# Patient Record
Sex: MALE | Race: WHITE | NOT HISPANIC OR LATINO | URBAN - METROPOLITAN AREA
[De-identification: names, ages, dates, MRNs, and addresses within clinical notes are randomized per-mention and may not be internally consistent; named-entity substitution may affect disease eponyms.]

---

## 2024-03-14 ENCOUNTER — OV NP (OUTPATIENT)
Dept: URBAN - METROPOLITAN AREA CLINIC 118 | Facility: CLINIC | Age: 44
End: 2024-03-14
Payer: COMMERCIAL

## 2024-03-14 VITALS
SYSTOLIC BLOOD PRESSURE: 120 MMHG | WEIGHT: 223 LBS | BODY MASS INDEX: 31.92 KG/M2 | HEART RATE: 68 BPM | DIASTOLIC BLOOD PRESSURE: 78 MMHG | HEIGHT: 70 IN | TEMPERATURE: 98.2 F

## 2024-03-14 DIAGNOSIS — R10.84 GENERALIZED ABDOMINAL PAIN: ICD-10-CM

## 2024-03-14 DIAGNOSIS — K52.89 (LYMPHOCYTIC) MICROSCOPIC COLITIS: ICD-10-CM

## 2024-03-14 DIAGNOSIS — R63.4 WEIGHT LOSS: ICD-10-CM

## 2024-03-14 PROCEDURE — 99204 OFFICE O/P NEW MOD 45 MIN: CPT | Performed by: INTERNAL MEDICINE

## 2024-03-14 RX ORDER — SIMVASTATIN 40 MG/1
TABLET, FILM COATED ORAL
Qty: 90 TABLET | Status: ACTIVE | COMMUNITY

## 2024-03-14 RX ORDER — METFORMIN HCL 500 MG/1
TABLET, FILM COATED ORAL
Qty: 90 TABLET | Status: ACTIVE | COMMUNITY

## 2024-03-14 NOTE — HPI-TODAY'S VISIT:
The patient presents for evaluation of chronic diarrhea, abdominal pain and weight loss.  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  h/o obesity, has had intentional weight loss over the last few years with dietary changes.  He has had diarrhea for ~1 year, with 6-10 loose, non-bloody bm's per day.  Occassional nocturnal diarrhea episodes.  Has generalized abd discomfort a few times per week, preceds bm's at times and improves after bowel movement.  no obvious association with certain meals or dairy.  has lost ~10 lbs in the past 6 months but weight has stabilized.  no new recent meds.  has been on metformin for pre-diabetes for 4+ years.  was on abx last year (amoxicillin).

## 2024-03-22 LAB
A/G RATIO: 2.3
ABSOLUTE BASOPHILS: 41
ABSOLUTE EOSINOPHILS: 102
ABSOLUTE LYMPHOCYTES: 1408
ABSOLUTE MONOCYTES: 377
ABSOLUTE NEUTROPHILS: 3172
ALBUMIN: 4.1
ALKALINE PHOSPHATASE: 158
ALT (SGPT): 66
AST (SGOT): 38
BASOPHILS: 0.8
BILIRUBIN, TOTAL: 0.5
BUN/CREATININE RATIO: (no result)
BUN: 11
CALCIUM: 8.3
CARBON DIOXIDE, TOTAL: 25
CHLORIDE: 104
CREATININE: 0.76
EGFR: 113
ENDOMYSIAL ANTIBODY SCR: POSITIVE
ENDOMYSIAL ANTIBODY TITER: (no result)
EOSINOPHILS: 2
GLOBULIN, TOTAL: 1.8
GLUCOSE: 127
HEMATOCRIT: 43
HEMOGLOBIN: 14.3
IMMUNOGLOBULIN A: 212
INTERPRETATION: (no result)
LYMPHOCYTES: 27.6
MCH: 29.5
MCHC: 33.3
MCV: 88.7
MONOCYTES: 7.4
MPV: 10.1
NEUTROPHILS: 62.2
PLATELET COUNT: 192
POTASSIUM: 3.7
PROTEIN, TOTAL: 5.9
RDW: 12.3
RED BLOOD CELL COUNT: 4.85
SODIUM: 139
TISSUE TRANSGLUTAMINASE AB, IGA: >250
WHITE BLOOD CELL COUNT: 5.1

## 2024-03-23 LAB
CALPROTECTIN, FECAL: 91
CLOSTRIDIUM DIFFICILE TOXINB,QL REAL TIME PCR: NOT DETECTED

## 2024-03-25 ENCOUNTER — OV EP (OUTPATIENT)
Dept: URBAN - METROPOLITAN AREA CLINIC 118 | Facility: CLINIC | Age: 44
End: 2024-03-25
Payer: COMMERCIAL

## 2024-03-25 ENCOUNTER — LAB (OUTPATIENT)
Dept: URBAN - METROPOLITAN AREA CLINIC 118 | Facility: CLINIC | Age: 44
End: 2024-03-25

## 2024-03-25 VITALS
HEART RATE: 69 BPM | BODY MASS INDEX: 32.33 KG/M2 | TEMPERATURE: 98.4 F | HEIGHT: 70 IN | WEIGHT: 225.8 LBS | SYSTOLIC BLOOD PRESSURE: 112 MMHG | DIASTOLIC BLOOD PRESSURE: 73 MMHG

## 2024-03-25 DIAGNOSIS — R63.4 WEIGHT LOSS: ICD-10-CM

## 2024-03-25 DIAGNOSIS — K52.9 CHRONIC DIARRHEA: ICD-10-CM

## 2024-03-25 DIAGNOSIS — R10.84 GENERALIZED ABDOMINAL PAIN: ICD-10-CM

## 2024-03-25 DIAGNOSIS — K90.0 CELIAC DISEASE: ICD-10-CM

## 2024-03-25 PROBLEM — 396331005: Status: ACTIVE | Noted: 2024-03-25

## 2024-03-25 PROCEDURE — 99214 OFFICE O/P EST MOD 30 MIN: CPT | Performed by: INTERNAL MEDICINE

## 2024-03-25 RX ORDER — SIMVASTATIN 40 MG/1
TABLET, FILM COATED ORAL
Qty: 90 TABLET | Status: ACTIVE | COMMUNITY

## 2024-03-25 RX ORDER — METFORMIN HCL 500 MG/1
TABLET, FILM COATED ORAL
Qty: 90 TABLET | Status: ACTIVE | COMMUNITY

## 2024-03-25 NOTE — HPI-TODAY'S VISIT:
The patient presents for f/u appt.  continues to have abd bloating/diarrhea, mostly unchanged.  discussed labs with pt, suggesting celiac disease.

## 2024-03-27 ENCOUNTER — TELNP (OUTPATIENT)
Dept: URBAN - METROPOLITAN AREA TELEHEALTH 2 | Facility: TELEHEALTH | Age: 44
End: 2024-03-27

## 2024-03-27 RX ORDER — METFORMIN HCL 500 MG/1
TABLET, FILM COATED ORAL
Qty: 90 TABLET | Status: ACTIVE | COMMUNITY

## 2024-03-27 RX ORDER — SIMVASTATIN 40 MG/1
TABLET, FILM COATED ORAL
Qty: 90 TABLET | Status: ACTIVE | COMMUNITY

## 2024-04-11 ENCOUNTER — LAB (OUTPATIENT)
Dept: URBAN - METROPOLITAN AREA CLINIC 4 | Facility: CLINIC | Age: 44
End: 2024-04-11
Payer: COMMERCIAL

## 2024-04-11 ENCOUNTER — COL/EGD (OUTPATIENT)
Dept: URBAN - METROPOLITAN AREA SURGERY CENTER 23 | Facility: SURGERY CENTER | Age: 44
End: 2024-04-11
Payer: COMMERCIAL

## 2024-04-11 DIAGNOSIS — B96.81 HELICOBACTER PYLORI [H. PYLORI] AS THE CAUSE OF DISEASES CLASSIFIED ELSEWHERE: ICD-10-CM

## 2024-04-11 DIAGNOSIS — B96.81 BACTERIAL INFECTION DUE TO H. PYLORI: ICD-10-CM

## 2024-04-11 DIAGNOSIS — K25.9 ANTRAL ULCER: ICD-10-CM

## 2024-04-11 DIAGNOSIS — R19.7 ACUTE DIARRHEA: ICD-10-CM

## 2024-04-11 DIAGNOSIS — K29.80 ACUTE DUODENITIS: ICD-10-CM

## 2024-04-11 DIAGNOSIS — D12.3 ADENOMA OF TRANSVERSE COLON: ICD-10-CM

## 2024-04-11 DIAGNOSIS — K29.60 ADENOPAPILLOMATOSIS GASTRICA: ICD-10-CM

## 2024-04-11 DIAGNOSIS — K29.60 OTHER GASTRITIS WITHOUT BLEEDING: ICD-10-CM

## 2024-04-11 DIAGNOSIS — K90.0 CELIAC DISEASE: ICD-10-CM

## 2024-04-11 DIAGNOSIS — K90.1 CELIAC SPRUE: ICD-10-CM

## 2024-04-11 DIAGNOSIS — K29.81 DUODENITIS WITH BLEEDING: ICD-10-CM

## 2024-04-11 PROCEDURE — 88305 TISSUE EXAM BY PATHOLOGIST: CPT | Performed by: PATHOLOGY

## 2024-04-11 PROCEDURE — 88313 SPECIAL STAINS GROUP 2: CPT | Performed by: PATHOLOGY

## 2024-04-11 PROCEDURE — 45380 COLONOSCOPY AND BIOPSY: CPT | Performed by: INTERNAL MEDICINE

## 2024-04-11 PROCEDURE — 43239 EGD BIOPSY SINGLE/MULTIPLE: CPT | Performed by: INTERNAL MEDICINE

## 2024-04-11 PROCEDURE — 88342 IMHCHEM/IMCYTCHM 1ST ANTB: CPT | Performed by: PATHOLOGY

## 2024-04-11 PROCEDURE — 45385 COLONOSCOPY W/LESION REMOVAL: CPT | Performed by: INTERNAL MEDICINE

## 2024-04-11 RX ORDER — METFORMIN HCL 500 MG/1
TABLET, FILM COATED ORAL
Qty: 90 TABLET | Status: ACTIVE | COMMUNITY

## 2024-04-11 RX ORDER — SIMVASTATIN 40 MG/1
TABLET, FILM COATED ORAL
Qty: 90 TABLET | Status: ACTIVE | COMMUNITY

## 2024-04-25 ENCOUNTER — OV EP (OUTPATIENT)
Dept: URBAN - METROPOLITAN AREA CLINIC 118 | Facility: CLINIC | Age: 44
End: 2024-04-25
Payer: COMMERCIAL

## 2024-04-25 VITALS
TEMPERATURE: 98.1 F | BODY MASS INDEX: 31.07 KG/M2 | SYSTOLIC BLOOD PRESSURE: 121 MMHG | HEIGHT: 70 IN | WEIGHT: 217 LBS | DIASTOLIC BLOOD PRESSURE: 80 MMHG | HEART RATE: 65 BPM

## 2024-04-25 DIAGNOSIS — K90.0 CELIAC DISEASE: ICD-10-CM

## 2024-04-25 DIAGNOSIS — K25.9 GASTRIC ULCER WITHOUT HEMORRHAGE OR PERFORATION, UNSPECIFIED CHRONICITY: ICD-10-CM

## 2024-04-25 DIAGNOSIS — R63.4 WEIGHT LOSS, UNINTENTIONAL: ICD-10-CM

## 2024-04-25 DIAGNOSIS — A04.8 H. PYLORI INFECTION: ICD-10-CM

## 2024-04-25 PROBLEM — 73481001: Status: ACTIVE | Noted: 2024-04-25

## 2024-04-25 PROBLEM — 721730009: Status: ACTIVE | Noted: 2024-04-25

## 2024-04-25 PROBLEM — 428283002: Status: ACTIVE | Noted: 2024-04-25

## 2024-04-25 PROCEDURE — 99214 OFFICE O/P EST MOD 30 MIN: CPT | Performed by: INTERNAL MEDICINE

## 2024-04-25 RX ORDER — CLARITHROMYCIN 500 MG/1
1 TABLET TABLET ORAL
Qty: 20 TABLET | Refills: 0 | OUTPATIENT
Start: 2024-04-25 | End: 2024-05-05

## 2024-04-25 RX ORDER — AMOXICILLIN 500 MG/1
2 CAPSULES CAPSULE ORAL
Qty: 40 CAPSULE | Refills: 0 | OUTPATIENT
Start: 2024-04-25 | End: 2024-05-05

## 2024-04-25 RX ORDER — SIMVASTATIN 40 MG/1
TABLET, FILM COATED ORAL
Qty: 90 TABLET | Status: DISCONTINUED | COMMUNITY

## 2024-04-25 RX ORDER — METFORMIN HCL 500 MG/1
TABLET, FILM COATED ORAL
Qty: 90 TABLET | Status: DISCONTINUED | COMMUNITY

## 2024-04-25 RX ORDER — PANTOPRAZOLE SODIUM 40 MG/1
1 TABLET TABLET, DELAYED RELEASE ORAL ONCE A DAY
Qty: 30 | Refills: 3 | Status: ACTIVE | COMMUNITY
Start: 2024-04-11

## 2024-04-25 NOTE — HPI-TODAY'S VISIT:
The patient presents for f/u appt.  reports diarrhea is better since last appt.  has refrained from gluten and had stopped metformin.  appetite is good.  has had some weight loss since last appt.  discussed biopsy results from procedures with pt.

## 2024-07-01 ENCOUNTER — DASHBOARD ENCOUNTERS (OUTPATIENT)
Age: 44
End: 2024-07-01

## 2024-07-17 ENCOUNTER — ERX REFILL RESPONSE (OUTPATIENT)
Dept: URBAN - METROPOLITAN AREA CLINIC 109 | Facility: CLINIC | Age: 44
End: 2024-07-17

## 2024-07-17 RX ORDER — PANTOPRAZOLE SODIUM 40 MG/1
1 TABLET TABLET, DELAYED RELEASE ORAL ONCE A DAY
Qty: 30 | Refills: 3 | OUTPATIENT

## 2024-07-17 RX ORDER — PANTOPRAZOLE SODIUM 40 MG/1
TAKE ONE TABLET BY MOUTH ONE TIME DAILY TABLET, DELAYED RELEASE ORAL
Qty: 30 TABLET | Refills: 3 | OUTPATIENT

## 2024-07-26 ENCOUNTER — OFFICE VISIT (OUTPATIENT)
Dept: URBAN - METROPOLITAN AREA CLINIC 118 | Facility: CLINIC | Age: 44
End: 2024-07-26

## 2024-07-26 RX ORDER — PANTOPRAZOLE SODIUM 40 MG/1
1 TABLET TABLET, DELAYED RELEASE ORAL ONCE A DAY
Qty: 30 | Refills: 3 | COMMUNITY
Start: 2024-04-11

## 2025-01-08 ENCOUNTER — ERX REFILL RESPONSE (OUTPATIENT)
Dept: URBAN - METROPOLITAN AREA CLINIC 109 | Facility: CLINIC | Age: 45
End: 2025-01-08

## 2025-01-08 RX ORDER — PANTOPRAZOLE SODIUM 40 MG/1
TAKE ONE TABLET BY MOUTH ONE TIME DAILY TABLET, DELAYED RELEASE ORAL
Qty: 30 TABLET | Refills: 3 | OUTPATIENT

## 2025-01-08 RX ORDER — PANTOPRAZOLE SODIUM 40 MG/1
TAKE ONE TABLET BY MOUTH ONE TIME DAILY TABLET, DELAYED RELEASE ORAL
Qty: 30 TABLET | Refills: 4 | OUTPATIENT

## 2025-04-14 ENCOUNTER — TELEPHONE ENCOUNTER (OUTPATIENT)
Dept: URBAN - METROPOLITAN AREA CLINIC 118 | Facility: CLINIC | Age: 45
End: 2025-04-14

## 2025-04-30 ENCOUNTER — OFFICE VISIT (OUTPATIENT)
Dept: URBAN - NONMETROPOLITAN AREA CLINIC 4 | Facility: CLINIC | Age: 45
End: 2025-04-30

## 2025-04-30 RX ORDER — PANTOPRAZOLE SODIUM 40 MG/1
TAKE ONE TABLET BY MOUTH ONE TIME DAILY TABLET, DELAYED RELEASE ORAL
Qty: 30 TABLET | Refills: 3 | Status: ACTIVE | COMMUNITY

## 2025-04-30 RX ORDER — PANTOPRAZOLE SODIUM 40 MG/1
TAKE ONE TABLET BY MOUTH ONE TIME DAILY TABLET, DELAYED RELEASE ORAL
Qty: 30 TABLET | Refills: 3 | OUTPATIENT
Start: 2025-04-30

## 2025-04-30 NOTE — HPI-TODAY'S VISIT:
The patient presents for f/u appt.  reports diarrhea is better since last appt.  has refrained from gluten and had stopped metformin.  appetite is good.  has had some weight loss since last appt.  discussed biopsy results from procedures with pt. The patient presents for f/u appt.  continues to have abd bloating/diarrhea, mostly unchanged.  discussed labs with pt, suggesting celiac disease. The patient presents for evaluation of chronic diarrhea, abdominal pain and weight loss.  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  h/o obesity, has had intentional weight loss over the last few years with dietary changes.  He has had diarrhea for ~1 year, with 6-10 loose, non-bloody bm's per day.  Occassional nocturnal diarrhea episodes.  Has generalized abd discomfort a few times per week, preceds bm's at times and improves after bowel movement.  no obvious association with certain meals or dairy.  has lost ~10 lbs in the past 6 months but weight has stabilized.  no new recent meds.  has been on metformin for pre-diabetes for 4+ years.  was on abx last year (amoxicillin).  4.30.25 here today for follow up, diagnosed with Celiac disease 4/2024 and has been gluten free and his GI symptoms "are life changing"  he denies any abd pain nor diarrhea.  Upon reviewing his EGD he also Was noted to have several gastric erosions as well as one 5mm non bleeding ulcer. His biopsies were positive for H pylori and he did complete treatment but did not get confirmation of Eradication.

## 2025-06-28 ENCOUNTER — ERX REFILL RESPONSE (OUTPATIENT)
Dept: URBAN - METROPOLITAN AREA CLINIC 109 | Facility: CLINIC | Age: 45
End: 2025-06-28

## 2025-06-28 RX ORDER — PANTOPRAZOLE SODIUM 40 MG/1
TAKE ONE TABLET BY MOUTH ONE TIME DAILY TABLET, DELAYED RELEASE ORAL
Qty: 30 TABLET | Refills: 1 | OUTPATIENT

## 2025-08-29 ENCOUNTER — ERX REFILL RESPONSE (OUTPATIENT)
Dept: URBAN - METROPOLITAN AREA CLINIC 109 | Facility: CLINIC | Age: 45
End: 2025-08-29

## 2025-08-29 RX ORDER — PANTOPRAZOLE SODIUM 40 MG/1
TAKE ONE TABLET BY MOUTH ONE TIME DAILY TABLET, DELAYED RELEASE ORAL
Qty: 30 TABLET | Refills: 1 | OUTPATIENT